# Patient Record
Sex: MALE | Race: OTHER | Employment: FULL TIME | URBAN - METROPOLITAN AREA
[De-identification: names, ages, dates, MRNs, and addresses within clinical notes are randomized per-mention and may not be internally consistent; named-entity substitution may affect disease eponyms.]

---

## 2024-06-04 ENCOUNTER — APPOINTMENT (EMERGENCY)
Dept: CT IMAGING | Facility: HOSPITAL | Age: 24
End: 2024-06-04
Payer: MEDICAID

## 2024-06-04 ENCOUNTER — HOSPITAL ENCOUNTER (EMERGENCY)
Facility: HOSPITAL | Age: 24
Discharge: HOME | End: 2024-06-04
Attending: INTERNAL MEDICINE
Payer: MEDICAID

## 2024-06-04 VITALS
DIASTOLIC BLOOD PRESSURE: 80 MMHG | HEART RATE: 96 BPM | TEMPERATURE: 97.8 F | SYSTOLIC BLOOD PRESSURE: 141 MMHG | RESPIRATION RATE: 16 BRPM | OXYGEN SATURATION: 100 % | WEIGHT: 266.98 LBS

## 2024-06-04 DIAGNOSIS — K59.00 CONSTIPATION: Primary | ICD-10-CM

## 2024-06-04 LAB
ALBUMIN SERPL BCP-MCNC: 4.7 G/DL (ref 3.5–5)
ALP SERPL-CCNC: 66 U/L (ref 34–104)
ALT SERPL W P-5'-P-CCNC: 16 U/L (ref 7–52)
ANION GAP SERPL CALCULATED.3IONS-SCNC: 13 MMOL/L (ref 4–13)
AST SERPL W P-5'-P-CCNC: 19 U/L (ref 13–39)
BASOPHILS # BLD AUTO: 0.06 THOUSANDS/ÂΜL (ref 0–0.1)
BASOPHILS NFR BLD AUTO: 1 % (ref 0–1)
BILIRUB SERPL-MCNC: 0.6 MG/DL (ref 0.2–1)
BUN SERPL-MCNC: 11 MG/DL (ref 5–25)
CALCIUM SERPL-MCNC: 10 MG/DL (ref 8.4–10.2)
CHLORIDE SERPL-SCNC: 101 MMOL/L (ref 96–108)
CO2 SERPL-SCNC: 21 MMOL/L (ref 21–32)
CREAT SERPL-MCNC: 0.89 MG/DL (ref 0.6–1.3)
EOSINOPHIL # BLD AUTO: 0.03 THOUSAND/ÂΜL (ref 0–0.61)
EOSINOPHIL NFR BLD AUTO: 0 % (ref 0–6)
ERYTHROCYTE [DISTWIDTH] IN BLOOD BY AUTOMATED COUNT: 12.4 % (ref 11.6–15.1)
GFR SERPL CREATININE-BSD FRML MDRD: 119 ML/MIN/1.73SQ M
GLUCOSE SERPL-MCNC: 89 MG/DL (ref 65–140)
HCT VFR BLD AUTO: 43.3 % (ref 36.5–49.3)
HGB BLD-MCNC: 14.6 G/DL (ref 12–17)
IMM GRANULOCYTES # BLD AUTO: 0.06 THOUSAND/UL (ref 0–0.2)
IMM GRANULOCYTES NFR BLD AUTO: 1 % (ref 0–2)
LIPASE SERPL-CCNC: 10 U/L (ref 11–82)
LYMPHOCYTES # BLD AUTO: 2.19 THOUSANDS/ÂΜL (ref 0.6–4.47)
LYMPHOCYTES NFR BLD AUTO: 21 % (ref 14–44)
MCH RBC QN AUTO: 28.4 PG (ref 26.8–34.3)
MCHC RBC AUTO-ENTMCNC: 33.7 G/DL (ref 31.4–37.4)
MCV RBC AUTO: 84 FL (ref 82–98)
MONOCYTES # BLD AUTO: 0.78 THOUSAND/ÂΜL (ref 0.17–1.22)
MONOCYTES NFR BLD AUTO: 7 % (ref 4–12)
NEUTROPHILS # BLD AUTO: 7.51 THOUSANDS/ÂΜL (ref 1.85–7.62)
NEUTS SEG NFR BLD AUTO: 70 % (ref 43–75)
NRBC BLD AUTO-RTO: 0 /100 WBCS
PLATELET # BLD AUTO: 272 THOUSANDS/UL (ref 149–390)
PMV BLD AUTO: 11 FL (ref 8.9–12.7)
POTASSIUM SERPL-SCNC: 3.8 MMOL/L (ref 3.5–5.3)
PROT SERPL-MCNC: 8.4 G/DL (ref 6.4–8.4)
RBC # BLD AUTO: 5.14 MILLION/UL (ref 3.88–5.62)
SODIUM SERPL-SCNC: 135 MMOL/L (ref 135–147)
WBC # BLD AUTO: 10.63 THOUSAND/UL (ref 4.31–10.16)

## 2024-06-04 PROCEDURE — 80053 COMPREHEN METABOLIC PANEL: CPT

## 2024-06-04 PROCEDURE — 85025 COMPLETE CBC W/AUTO DIFF WBC: CPT

## 2024-06-04 PROCEDURE — 83690 ASSAY OF LIPASE: CPT

## 2024-06-04 PROCEDURE — 99285 EMERGENCY DEPT VISIT HI MDM: CPT

## 2024-06-04 PROCEDURE — 74177 CT ABD & PELVIS W/CONTRAST: CPT

## 2024-06-04 PROCEDURE — 36415 COLL VENOUS BLD VENIPUNCTURE: CPT

## 2024-06-04 PROCEDURE — 99283 EMERGENCY DEPT VISIT LOW MDM: CPT

## 2024-06-04 PROCEDURE — 96360 HYDRATION IV INFUSION INIT: CPT

## 2024-06-04 RX ORDER — KETOROLAC TROMETHAMINE 30 MG/ML
15 INJECTION, SOLUTION INTRAMUSCULAR; INTRAVENOUS ONCE
Status: DISCONTINUED | OUTPATIENT
Start: 2024-06-04 | End: 2024-06-04 | Stop reason: HOSPADM

## 2024-06-04 RX ORDER — POLYETHYLENE GLYCOL 3350 17 G/17G
136 POWDER, FOR SOLUTION ORAL ONCE
Qty: 136 G | Refills: 0 | Status: SHIPPED | OUTPATIENT
Start: 2024-06-05 | End: 2024-06-05

## 2024-06-04 RX ADMIN — IOHEXOL 100 ML: 350 INJECTION, SOLUTION INTRAVENOUS at 09:41

## 2024-06-04 RX ADMIN — SODIUM CHLORIDE 1000 ML: 0.9 INJECTION, SOLUTION INTRAVENOUS at 08:58

## 2024-06-04 NOTE — ED PROVIDER NOTES
"History  Chief Complaint   Patient presents with    Constipation     States that he has been constipated for over a month. States that last bm was 3 weeks ago     24-year-old male presents today with concerns of constipation for the last month.  Patient states that he has not had a bowel movement in the last 3 weeks.  Patient states has had similar history of the same.  Denies any rectal bleeding or rectal pain.  States that he feels like he \"is about to burst\".  Denies any nausea or vomiting.  States that he has been eating but states that he has not been having any bowel movements.  No fever or chills.  No sick contacts.  No chest pain or shortness of breath.  Denies any cough.  No urinary symptoms.        None       Past Medical History:   Diagnosis Date    No known health problems        Past Surgical History:   Procedure Laterality Date    NO PAST SURGERIES         History reviewed. No pertinent family history.  I have reviewed and agree with the history as documented.    E-Cigarette/Vaping    E-Cigarette Use Never User      E-Cigarette/Vaping Substances     Social History     Tobacco Use    Smoking status: Never    Smokeless tobacco: Never   Vaping Use    Vaping status: Never Used   Substance Use Topics    Alcohol use: Never    Drug use: Never       Review of Systems   Constitutional:  Negative for chills and fever.   HENT:  Negative for ear pain and sore throat.    Eyes:  Negative for pain and visual disturbance.   Respiratory:  Negative for cough and shortness of breath.    Cardiovascular:  Negative for chest pain and palpitations.   Gastrointestinal:  Positive for abdominal distention, abdominal pain and constipation. Negative for anal bleeding, blood in stool, diarrhea, nausea, rectal pain and vomiting.   Genitourinary:  Negative for dysuria and hematuria.   Musculoskeletal:  Negative for arthralgias and back pain.   Skin:  Negative for color change and rash.   Neurological:  Negative for seizures and " syncope.   All other systems reviewed and are negative.      Physical Exam  Physical Exam  Vitals and nursing note reviewed.   Constitutional:       General: He is not in acute distress.     Appearance: He is ill-appearing.   HENT:      Head: Normocephalic and atraumatic.   Eyes:      General: No scleral icterus.     Conjunctiva/sclera: Conjunctivae normal.      Pupils: Pupils are equal, round, and reactive to light.   Cardiovascular:      Rate and Rhythm: Normal rate and regular rhythm.      Pulses: Normal pulses.   Pulmonary:      Effort: Pulmonary effort is normal. No respiratory distress.   Abdominal:      General: There is distension.      Palpations: There is no mass.      Tenderness: There is abdominal tenderness. There is no right CVA tenderness, left CVA tenderness, guarding or rebound.      Hernia: No hernia is present.   Musculoskeletal:         General: Normal range of motion.      Cervical back: Normal range of motion.   Skin:     General: Skin is warm and dry.      Capillary Refill: Capillary refill takes less than 2 seconds.      Coloration: Skin is not jaundiced.   Neurological:      Mental Status: He is alert and oriented to person, place, and time.         Vital Signs  ED Triage Vitals   Temperature Pulse Respirations Blood Pressure SpO2   06/04/24 0828 06/04/24 0828 06/04/24 0828 06/04/24 0828 06/04/24 0828   97.8 °F (36.6 °C) (!) 110 20 148/77 94 %      Temp Source Heart Rate Source Patient Position - Orthostatic VS BP Location FiO2 (%)   06/04/24 0828 06/04/24 0828 06/04/24 1019 06/04/24 1019 --   Tympanic Monitor Lying Left arm       Pain Score       --                  Vitals:    06/04/24 0828 06/04/24 1019   BP: 148/77 141/80   Pulse: (!) 110 96   Patient Position - Orthostatic VS:  Lying         Visual Acuity      ED Medications  Medications   sodium chloride 0.9 % bolus 1,000 mL (0 mL Intravenous Stopped 6/4/24 1015)   iohexol (OMNIPAQUE) 350 MG/ML injection (MULTI-DOSE) 100 mL (100 mL  Intravenous Given 6/4/24 0941)       Diagnostic Studies  Results Reviewed       Procedure Component Value Units Date/Time    Comprehensive metabolic panel [[id number]] Collected: 06/04/24 0858    Lab Status: Final result Specimen: Blood from Arm, Right Updated: 06/04/24 0922     Sodium 135 mmol/L      Potassium 3.8 mmol/L      Chloride 101 mmol/L      CO2 21 mmol/L      ANION GAP 13 mmol/L      BUN 11 mg/dL      Creatinine 0.89 mg/dL      Glucose 89 mg/dL      Calcium 10.0 mg/dL      AST 19 U/L      ALT 16 U/L      Alkaline Phosphatase 66 U/L      Total Protein 8.4 g/dL      Albumin 4.7 g/dL      Total Bilirubin 0.60 mg/dL      eGFR 119 ml/min/1.73sq m     Narrative:      National Kid[employer] Foundation guidelines for Chronic Kidney Disease (CKD):     Stage 1 with normal or high GFR (GFR > 90 mL/min/1.73 square meters)    Stage 2 Mild CKD (GFR = 60-89 mL/min/1.73 square meters)    Stage 3A Moderate CKD (GFR = 45-59 mL/min/1.73 square meters)    Stage 3B Moderate CKD (GFR = 30-44 mL/min/1.73 square meters)    Stage 4 Severe CKD (GFR = 15-29 mL/min/1.73 square meters)    Stage 5 End Stage CKD (GFR <15 mL/min/1.73 square meters)  Note: GFR calculation is accurate only with a steady state creatinine    Lipase [[id number]]  (Abnormal) Collected: 06/04/24 0858    Lab Status: Final result Specimen: Blood from Arm, Right Updated: 06/04/24 0922     Lipase 10 u/L     CBC and differential [[id number]]  (Abnormal) Collected: 06/04/24 0858    Lab Status: Final result Specimen: Blood from Arm, Right Updated: 06/04/24 0903     WBC 10.63 Thousand/uL      RBC 5.14 Million/uL      Hemoglobin 14.6 g/dL      Hematocrit 43.3 %      MCV 84 fL      MCH 28.4 pg      MCHC 33.7 g/dL      RDW 12.4 %      MPV 11.0 fL      Platelets 272 Thousands/uL      nRBC 0 /100 WBCs      Segmented % 70 %      Immature Grans % 1 %      Lymphocytes % 21 %      Monocytes % 7 %      Eosinophils Relative 0 %      Basophils Relative 1 %      Absolute Neutrophils  "7.51 Thousands/µL      Absolute Immature Grans 0.06 Thousand/uL      Absolute Lymphocytes 2.19 Thousands/µL      Absolute Monocytes 0.78 Thousand/µL      Eosinophils Absolute 0.03 Thousand/µL      Basophils Absolute 0.06 Thousands/µL                    CT abdomen pelvis with contrast   Final Result by Tony Diaz MD (06/04 0959)      No acute abdominopelvic findings.         Workstation performed: [id number]                    Procedures  Procedures         ED Course  ED Course as of 06/04/24 1404   Tue Jun 04, 2024   0906 WBC(!): 10.63                               SBIRT 20yo+      Flowsheet Row Most Recent Value   Initial Alcohol Screen: US AUDIT-C     1. How often do you have a drink containing alcohol? 0 Filed at: 06/04/2024 0844   2. How many drinks containing alcohol do you have on a typical day you are drinking?  0 Filed at: 06/04/2024 0844   3a. Male UNDER 65: How often do you have five or more drinks on one occasion? 0 Filed at: 06/04/2024 0844   Audit-C Score 0 Filed at: 06/04/2024 0844   CORNELL: How many times in the past year have you...    Used an illegal drug or used a prescription medication for non-medical reasons? Never Filed at: 06/04/2024 0844                      Medical Decision Making  24-year-old male presents today with concerns of constipation and abdominal pain.  Lab workup.  Toradol.  Will stay away from any opioids as this may worsen patient's symptoms.  CT abdomen pelvis secondary to tenderness palpation or guarding, diffuse in the abdomen.  Patient states he is \"tried everything\" for this constipation.  Lab workup reassuring.  CT abdomen pelvis did not show any acute intra-abdominal pathology.  No obstruction.  No impaction.  Discharged with MiraLAX and enema.  GI failure.  Referral sent.  ------------------------------------------------------------  Strict return precautions discussed. Patient at time of discharge well-appearing in no acute distress, all questions answered. " "Patient agreeable to plan.  Patient's vitals, lab/imaging results, diagnosis, and treatment plan were discussed with the patient. All new/changed medications were discussed with patient, specifically, route of administration, how often and when to take, and where they can be picked up. Strict return precautions as well as close follow up with PCP was discussed with the patient and the patient was agreeable to my recommendations.  Patient verbally acknowledged understanding of the above communications. All labs reviewed and utilized in the medical decision making process (if labs were ordered). Portions of the record may have been created with voice recognition software.  Occasional wrong word or \"sound a like\" substitutions may have occurred due to the inherent limitations of voice recognition software.  Read the chart carefully and recognize, using context, where substitutions have occurred.      Amount and/or Complexity of Data Reviewed  Labs: ordered. Decision-making details documented in ED Course.  Radiology: ordered.    Risk  OTC drugs.  Prescription drug management.             Disposition  Final diagnoses:   Constipation     Time reflects when diagnosis was documented in both MDM as applicable and the Disposition within this note       Time User Action Codes Description Comment    6/4/2024 10:09 AM Hugo Wolf Add [K59.00] Constipation           ED Disposition       ED Disposition   Discharge    Condition   Stable    Date/Time   Tue Jun 4, 2024 1009    Comment   Joyce Mari discharge to home/self care.                   Follow-up Information       Follow up With Specialties Details Why Contact Info Additional Information    [facility] Emergency Department Emergency Medicine Go to  If symptoms worsen [address] [facility] [id number] [phone] [facility] Emergency Department    [facility] [place] " Gastroenterology Schedule an appointment as soon as possible for a visit   [address] [place] [id number] [phone] [facility] [place], [address] [facility].  [place], [place] [address] [phone]            Discharge Medication List as of 6/4/2024 10:12 AM        START taking these medications    Details   bisacodyl (FLEET) 10 MG/30ML ENEM Insert 30 mL (10 mg total) into the rectum once for 1 dose Do not start before June 5, 2024., Starting Wed 6/5/2024, Normal      polyethylene glycol (MIRALAX) 17 g packet Take 136 g by mouth once for 1 dose With two large glasses of water. Do not start before June 5, 2024., Starting Wed 6/5/2024, Normal                 PDMP Review       None            ED Provider  Electronically Signed by             Hugo Wolf PA-C  06/04/24 3145

## 2024-06-04 NOTE — Clinical Note
Jyoce Mari was seen and treated in our emergency department on 6/4/2024.    No restrictions            Diagnosis:     Joyce  .    He may return on this date: 06/06/2024    Patient needs to be off 6/5 for treatment.     If you have any questions or concerns, please don't hesitate to call.      Day Berger MD    ______________________________           _______________          _______________  Hospital Representative                              Date                                Time

## 2024-07-18 ENCOUNTER — HOSPITAL ENCOUNTER (EMERGENCY)
Facility: HOSPITAL | Age: 24
Discharge: HOME | End: 2024-07-18
Attending: EMERGENCY MEDICINE
Payer: MEDICAID

## 2024-07-18 ENCOUNTER — APPOINTMENT (EMERGENCY)
Dept: CT IMAGING | Facility: HOSPITAL | Age: 24
End: 2024-07-18
Payer: MEDICAID

## 2024-07-18 VITALS
TEMPERATURE: 97.6 F | DIASTOLIC BLOOD PRESSURE: 60 MMHG | SYSTOLIC BLOOD PRESSURE: 117 MMHG | RESPIRATION RATE: 18 BRPM | WEIGHT: 266.76 LBS | HEART RATE: 83 BPM | OXYGEN SATURATION: 98 %

## 2024-07-18 DIAGNOSIS — R10.9 ABDOMINAL PAIN: Primary | ICD-10-CM

## 2024-07-18 DIAGNOSIS — K29.70 GASTRITIS: ICD-10-CM

## 2024-07-18 LAB
ANION GAP SERPL CALCULATED.3IONS-SCNC: 7 MMOL/L (ref 4–13)
BACTERIA UR QL AUTO: ABNORMAL /HPF
BASOPHILS # BLD AUTO: 0.06 THOUSANDS/ÂΜL (ref 0–0.1)
BASOPHILS NFR BLD AUTO: 1 % (ref 0–1)
BILIRUB UR QL STRIP: NEGATIVE
BUN SERPL-MCNC: 10 MG/DL (ref 5–25)
CALCIUM SERPL-MCNC: 9.1 MG/DL (ref 8.4–10.2)
CHLORIDE SERPL-SCNC: 99 MMOL/L (ref 96–108)
CLARITY UR: CLEAR
CO2 SERPL-SCNC: 26 MMOL/L (ref 21–32)
COLOR UR: YELLOW
CREAT SERPL-MCNC: 0.8 MG/DL (ref 0.6–1.3)
EOSINOPHIL # BLD AUTO: 0.02 THOUSAND/ÂΜL (ref 0–0.61)
EOSINOPHIL NFR BLD AUTO: 0 % (ref 0–6)
ERYTHROCYTE [DISTWIDTH] IN BLOOD BY AUTOMATED COUNT: 12.6 % (ref 11.6–15.1)
GFR SERPL CREATININE-BSD FRML MDRD: 125 ML/MIN/1.73SQ M
GLUCOSE SERPL-MCNC: 85 MG/DL (ref 65–140)
GLUCOSE UR STRIP-MCNC: NEGATIVE MG/DL
HCT VFR BLD AUTO: 40.9 % (ref 36.5–49.3)
HGB BLD-MCNC: 13.8 G/DL (ref 12–17)
HGB UR QL STRIP.AUTO: ABNORMAL
IMM GRANULOCYTES # BLD AUTO: 0.03 THOUSAND/UL (ref 0–0.2)
IMM GRANULOCYTES NFR BLD AUTO: 0 % (ref 0–2)
KETONES UR STRIP-MCNC: ABNORMAL MG/DL
LACTATE SERPL-SCNC: 0.8 MMOL/L (ref 0.5–2)
LEUKOCYTE ESTERASE UR QL STRIP: NEGATIVE
LIPASE SERPL-CCNC: 9 U/L (ref 11–82)
LYMPHOCYTES # BLD AUTO: 1.95 THOUSANDS/ÂΜL (ref 0.6–4.47)
LYMPHOCYTES NFR BLD AUTO: 20 % (ref 14–44)
MCH RBC QN AUTO: 27.8 PG (ref 26.8–34.3)
MCHC RBC AUTO-ENTMCNC: 33.7 G/DL (ref 31.4–37.4)
MCV RBC AUTO: 82 FL (ref 82–98)
MONOCYTES # BLD AUTO: 0.84 THOUSAND/ÂΜL (ref 0.17–1.22)
MONOCYTES NFR BLD AUTO: 9 % (ref 4–12)
NEUTROPHILS # BLD AUTO: 6.84 THOUSANDS/ÂΜL (ref 1.85–7.62)
NEUTS SEG NFR BLD AUTO: 70 % (ref 43–75)
NITRITE UR QL STRIP: NEGATIVE
NON-SQ EPI CELLS URNS QL MICRO: ABNORMAL /HPF
NRBC BLD AUTO-RTO: 0 /100 WBCS
PH UR STRIP.AUTO: 6 [PH] (ref 4.5–8)
PLATELET # BLD AUTO: 233 THOUSANDS/UL (ref 149–390)
PMV BLD AUTO: 10.9 FL (ref 8.9–12.7)
POTASSIUM SERPL-SCNC: 3.7 MMOL/L (ref 3.5–5.3)
PROT UR STRIP-MCNC: NEGATIVE MG/DL
RBC # BLD AUTO: 4.97 MILLION/UL (ref 3.88–5.62)
RBC #/AREA URNS AUTO: ABNORMAL /HPF
SODIUM SERPL-SCNC: 132 MMOL/L (ref 135–147)
SP GR UR STRIP.AUTO: <=1.005 (ref 1–1.03)
TSH SERPL DL<=0.05 MIU/L-ACNC: 3.21 UIU/ML (ref 0.45–4.5)
UROBILINOGEN UR QL STRIP.AUTO: 0.2 E.U./DL
WBC # BLD AUTO: 9.74 THOUSAND/UL (ref 4.31–10.16)
WBC #/AREA URNS AUTO: ABNORMAL /HPF

## 2024-07-18 PROCEDURE — 99284 EMERGENCY DEPT VISIT MOD MDM: CPT

## 2024-07-18 PROCEDURE — 83605 ASSAY OF LACTIC ACID: CPT

## 2024-07-18 PROCEDURE — 96374 THER/PROPH/DIAG INJ IV PUSH: CPT

## 2024-07-18 PROCEDURE — 36415 COLL VENOUS BLD VENIPUNCTURE: CPT

## 2024-07-18 PROCEDURE — 83690 ASSAY OF LIPASE: CPT

## 2024-07-18 PROCEDURE — 85025 COMPLETE CBC W/AUTO DIFF WBC: CPT

## 2024-07-18 PROCEDURE — 80048 BASIC METABOLIC PNL TOTAL CA: CPT

## 2024-07-18 PROCEDURE — 74177 CT ABD & PELVIS W/CONTRAST: CPT

## 2024-07-18 PROCEDURE — 84443 ASSAY THYROID STIM HORMONE: CPT

## 2024-07-18 PROCEDURE — 96361 HYDRATE IV INFUSION ADD-ON: CPT

## 2024-07-18 PROCEDURE — 81001 URINALYSIS AUTO W/SCOPE: CPT

## 2024-07-18 PROCEDURE — 99285 EMERGENCY DEPT VISIT HI MDM: CPT

## 2024-07-18 RX ORDER — SUCRALFATE 1 G/1
1 TABLET ORAL DAILY
Qty: 7 TABLET | Refills: 0 | Status: SHIPPED | OUTPATIENT
Start: 2024-07-18 | End: 2024-07-25

## 2024-07-18 RX ORDER — FAMOTIDINE 20 MG/1
20 TABLET, FILM COATED ORAL 2 TIMES DAILY
Qty: 15 TABLET | Refills: 0 | Status: SHIPPED | OUTPATIENT
Start: 2024-07-18

## 2024-07-18 RX ORDER — SUCRALFATE 1 G/1
1 TABLET ORAL ONCE
Status: COMPLETED | OUTPATIENT
Start: 2024-07-18 | End: 2024-07-18

## 2024-07-18 RX ORDER — KETOROLAC TROMETHAMINE 30 MG/ML
15 INJECTION, SOLUTION INTRAMUSCULAR; INTRAVENOUS ONCE
Status: COMPLETED | OUTPATIENT
Start: 2024-07-18 | End: 2024-07-18

## 2024-07-18 RX ADMIN — SUCRALFATE 1 G: 1 TABLET ORAL at 11:24

## 2024-07-18 RX ADMIN — IOHEXOL 100 ML: 350 INJECTION, SOLUTION INTRAVENOUS at 10:20

## 2024-07-18 RX ADMIN — KETOROLAC TROMETHAMINE 15 MG: 30 INJECTION, SOLUTION INTRAMUSCULAR; INTRAVENOUS at 09:35

## 2024-07-18 RX ADMIN — SODIUM CHLORIDE 1000 ML: 0.9 INJECTION, SOLUTION INTRAVENOUS at 09:38

## 2024-07-18 NOTE — DISCHARGE INSTRUCTIONS
Patient advised to follow-up PCP for today's ED visit.  Patient vies follow-up with gastroenterology for today's ED visit.  Patient advised to take prescribed medication as prescribed. Patient was advised to return to the ED with any worsening symptoms that were explained on discharge including but not limited to chest pain, shortness of breath, irretractable vomiting or diarrhea, vision loss, loss of function, loss of sensation, syncope, hemoptysis, hematochezia, hematemesis, melena, decreased oral intake, feeling ill.     Ambulatory referral placed to GI.  Symptoms worsen before visit please return to ED

## 2024-07-18 NOTE — Clinical Note
Joyce Mari was seen and treated in our emergency department on 7/18/2024.                Diagnosis: gerd/abdominal pain    Joyce  may return to work on return date.    He may return on this date: 07/19/2024         If you have any questions or concerns, please don't hesitate to call.      Clayton Man PA-C    ______________________________           _______________          _______________  Hospital Representative                              Date                                Time

## 2024-07-18 NOTE — ED PROVIDER NOTES
"History  Chief Complaint   Patient presents with    Abdominal Pain     Ongoing upper abdominal burning. \"I've been dealing with this all my life and the symptoms line up.\" Concerned for obstruction, but has no formal GI dx. Symptoms getting worse for last 2 weeks.      24-year-old male presented to ED with a chief complaint of left upper quadrant abdominal pain.  Stated that he has been dealing with this pain for the past 2 weeks worsening in the last week.  Patient states that it is a burning sensation throughout his abdomen and he is extremely concerned of a bowel obstruction and stated that his last regular bowel movement was about a month ago and any bowel movement he has now feels like it is just bile and liquid.  Patient also endorses nausea and vomiting over the last week.  He states is gone to a point where he cannot deal with anymore he rates the pain an 8 out of 10.  He states that he has not been eating due to the pain.  Patient denies any chills fevers diaphoresis.  Patient stated that he has been dealing with no problems of swelling to her legs and has been seen for obstruction symptoms in the past.  He states that this time it is 10 times worse than the previous times.  He denies any hematochezia. Patient denies any chest pain, shortness of breath, vomiting, diarrhea, chills, diaphoresis, fevers, loss of consciousness, syncope, urinary and bowel changes, visual symptoms, vision loss, loss of function, loss of sensation,  hemoptysis, hematochezia, hematemesis, melena, confusion.         Prior to Admission Medications   Prescriptions Last Dose Informant Patient Reported? Taking?   bisacodyl (FLEET) 10 MG/30ML ENEM   No No   Sig: Insert 30 mL (10 mg total) into the rectum once for 1 dose Do not start before June 5, 2024.   polyethylene glycol (MIRALAX) 17 g packet   No No   Sig: Take 136 g by mouth once for 1 dose With two large glasses of water. Do not start before June 5, 2024.    "   Facility-Administered Medications: None       Past Medical History:   Diagnosis Date    No known health problems        Past Surgical History:   Procedure Laterality Date    NO PAST SURGERIES         History reviewed. No pertinent family history.  I have reviewed and agree with the history as documented.    E-Cigarette/Vaping    E-Cigarette Use Never User      E-Cigarette/Vaping Substances     Social History     Tobacco Use    Smoking status: Never    Smokeless tobacco: Never   Vaping Use    Vaping status: Never Used   Substance Use Topics    Alcohol use: Never    Drug use: Never       Review of Systems   Constitutional:  Positive for appetite change. Negative for activity change, chills, diaphoresis, fatigue and fever.   HENT:  Negative for congestion, ear discharge, ear pain, postnasal drip, rhinorrhea, sinus pressure, sinus pain and sore throat.    Eyes:  Negative for photophobia, pain, discharge, redness, itching and visual disturbance.   Respiratory:  Negative for cough, chest tightness and shortness of breath.    Cardiovascular:  Negative for chest pain and palpitations.   Gastrointestinal:  Positive for abdominal distention, abdominal pain, constipation, diarrhea, nausea and vomiting.   Genitourinary:  Negative for difficulty urinating, dysuria, flank pain, frequency and hematuria.   Musculoskeletal:  Negative for arthralgias, back pain, joint swelling, myalgias, neck pain and neck stiffness.   Skin:  Negative for rash and wound.   Neurological:  Negative for dizziness, tremors, syncope, facial asymmetry, weakness, light-headedness, numbness and headaches.       Physical Exam  Physical Exam  Vitals and nursing note reviewed.   Constitutional:       General: He is not in acute distress.     Appearance: He is normal weight. He is ill-appearing. He is not toxic-appearing or diaphoretic.   HENT:      Head: Normocephalic.      Right Ear: Tympanic membrane, ear canal and external ear normal.      Left Ear:  Tympanic membrane, ear canal and external ear normal.      Nose: Nose normal. No congestion or rhinorrhea.      Mouth/Throat:      Mouth: Mucous membranes are moist.      Pharynx: Oropharynx is clear. No oropharyngeal exudate or posterior oropharyngeal erythema.   Eyes:      General:         Right eye: No discharge.         Left eye: No discharge.      Extraocular Movements: Extraocular movements intact.      Conjunctiva/sclera: Conjunctivae normal.      Pupils: Pupils are equal, round, and reactive to light.   Cardiovascular:      Rate and Rhythm: Normal rate and regular rhythm.      Pulses: Normal pulses.   Pulmonary:      Effort: Pulmonary effort is normal. No respiratory distress.      Breath sounds: Normal breath sounds. No stridor. No wheezing, rhonchi or rales.   Chest:      Chest wall: No tenderness.   Abdominal:      General: Bowel sounds are normal. There is distension.      Palpations: Abdomen is soft.      Tenderness: There is abdominal tenderness. There is no right CVA tenderness, left CVA tenderness, guarding or rebound.      Comments: Tenderness to palpation throughout the abdomen especially in the left upper quadrant.  Marques sign is negative no rebound tenderness no McBurney point tenderness.  No erythema edema or ecchymosis noted.  No rashes noted.  Patient does have an elevation of the left rib cage unsure if this is a follow-up for possible splenomegaly.  Overall abdomen is soft bowel sounds are heard.  Will CT to further evaluate   Musculoskeletal:         General: No tenderness. Normal range of motion.      Cervical back: Normal range of motion and neck supple. No rigidity or tenderness.   Lymphadenopathy:      Cervical: No cervical adenopathy.   Skin:     General: Skin is warm and dry.      Capillary Refill: Capillary refill takes less than 2 seconds.      Findings: No rash.   Neurological:      Mental Status: He is alert and oriented to person, place, and time.      Sensory: No sensory  deficit.   Psychiatric:         Mood and Affect: Mood normal.         Vital Signs  ED Triage Vitals   Temperature Pulse Respirations Blood Pressure SpO2   07/18/24 0908 07/18/24 0908 07/18/24 0908 07/18/24 0908 07/18/24 0908   97.6 °F (36.4 °C) 98 16 132/63 97 %      Temp Source Heart Rate Source Patient Position - Orthostatic VS BP Location FiO2 (%)   07/18/24 0908 07/18/24 0908 07/18/24 0908 07/18/24 0908 --   Oral Monitor Sitting Right arm       Pain Score       07/18/24 0909       8           Vitals:    07/18/24 0908 07/18/24 1045   BP: 132/63 117/60   Pulse: 98 83   Patient Position - Orthostatic VS: Sitting          Visual Acuity      ED Medications  Medications   sodium chloride 0.9 % bolus 1,000 mL (0 mL Intravenous Stopped 7/18/24 1218)   ketorolac (TORADOL) injection 15 mg (15 mg Intravenous Given 7/18/24 0935)   iohexol (OMNIPAQUE) 350 MG/ML injection (SINGLE-DOSE) 100 mL (100 mL Intravenous Given 7/18/24 1020)   sucralfate (CARAFATE) tablet 1 g (1 g Oral Given 7/18/24 1124)       Diagnostic Studies  Results Reviewed       Procedure Component Value Units Date/Time    Urine Microscopic [[id number]]  (Abnormal) Collected: 07/18/24 1036    Lab Status: Final result Specimen: Urine, Clean Catch Updated: 07/18/24 1153     RBC, UA 1-2 /hpf      WBC, UA 2-4 /hpf      Epithelial Cells Occasional /hpf      Bacteria, UA None Seen /hpf     Urine Macroscopic, POC [[id number]]  (Abnormal) Collected: 07/18/24 1036    Lab Status: Final result Specimen: Urine Updated: 07/18/24 1037     Color, UA Yellow     Clarity, UA Clear     pH, UA 6.0     Leukocytes, UA Negative     Nitrite, UA Negative     Protein, UA Negative mg/dl      Glucose, UA Negative mg/dl      Ketones, UA 15 (1+) mg/dl      Urobilinogen, UA 0.2 E.U./dl      Bilirubin, UA Negative     Occult Blood, UA Trace     Specific Gravity, UA <=1.005    Narrative:      CLINITEK RESULT    TSH, 3rd generation with Free T4 reflex [[id number]]  (Normal) Collected: 07/18/24  0938    Lab Status: Final result Specimen: Blood from Arm, Left Updated: 07/18/24 1020     TSH 3RD GENERATON 3.205 uIU/mL     Lipase [[id number]]  (Abnormal) Collected: 07/18/24 0938    Lab Status: Final result Specimen: Blood from Arm, Left Updated: 07/18/24 1004     Lipase 9 u/L     Basic metabolic panel [[id number]]  (Abnormal) Collected: 07/18/24 0938    Lab Status: Final result Specimen: Blood from Arm, Left Updated: 07/18/24 1004     Sodium 132 mmol/L      Potassium 3.7 mmol/L      Chloride 99 mmol/L      CO2 26 mmol/L      ANION GAP 7 mmol/L      BUN 10 mg/dL      Creatinine 0.80 mg/dL      Glucose 85 mg/dL      Calcium 9.1 mg/dL      eGFR 125 ml/min/1.73sq m     Narrative:      National Kid[employer] guidelines for Chronic Kidney Disease (CKD):     Stage 1 with normal or high GFR (GFR > 90 mL/min/1.73 square meters)    Stage 2 Mild CKD (GFR = 60-89 mL/min/1.73 square meters)    Stage 3A Moderate CKD (GFR = 45-59 mL/min/1.73 square meters)    Stage 3B Moderate CKD (GFR = 30-44 mL/min/1.73 square meters)    Stage 4 Severe CKD (GFR = 15-29 mL/min/1.73 square meters)    Stage 5 End Stage CKD (GFR <15 mL/min/1.73 square meters)  Note: GFR calculation is accurate only with a steady state creatinine    Lactic acid, plasma (w/reflex if result > 2.0) [[id number]]  (Normal) Collected: 07/18/24 0938    Lab Status: Final result Specimen: Blood from Arm, Left Updated: 07/18/24 1003     LACTIC ACID 0.8 mmol/L     Narrative:      Result may be elevated if tourniquet was used during collection.    CBC and differential [[id number]] Collected: 07/18/24 0938    Lab Status: Final result Specimen: Blood from Arm, Left Updated: 07/18/24 0945     WBC 9.74 Thousand/uL      RBC 4.97 Million/uL      Hemoglobin 13.8 g/dL      Hematocrit 40.9 %      MCV 82 fL      MCH 27.8 pg      MCHC 33.7 g/dL      RDW 12.6 %      MPV 10.9 fL      Platelets 233 Thousands/uL      nRBC 0 /100 WBCs      Segmented % 70 %      Immature Grans % 0 %       Lymphocytes % 20 %      Monocytes % 9 %      Eosinophils Relative 0 %      Basophils Relative 1 %      Absolute Neutrophils 6.84 Thousands/µL      Absolute Immature Grans 0.03 Thousand/uL      Absolute Lymphocytes 1.95 Thousands/µL      Absolute Monocytes 0.84 Thousand/µL      Eosinophils Absolute 0.02 Thousand/µL      Basophils Absolute 0.06 Thousands/µL                    CT abdomen pelvis with contrast   Final Result by Loki Ramirez MD (07/18 1047)   No acute findings.            Workstation performed: [id number]                    Procedures  Procedures         ED Course  ED Course as of 07/18/24 1806   Thu Jul 18, 2024   1047 Sodium(!): 132  Given bolus                                   SBIRT 22yo+      Flowsheet Row Most Recent Value   Initial Alcohol Screen: US AUDIT-C     1. How often do you have a drink containing alcohol? 0 Filed at: 07/18/2024 1103   2. How many drinks containing alcohol do you have on a typical day you are drinking?  0 Filed at: 07/18/2024 1103   3a. Male UNDER 65: How often do you have five or more drinks on one occasion? 0 Filed at: 07/18/2024 1103   Audit-C Score 0 Filed at: 07/18/2024 1103   CORNELL: How many times in the past year have you...    Used an illegal drug or used a prescription medication for non-medical reasons? Never Filed at: 07/18/2024 1103                      Medical Decision Making  24-year-old male presented to ED with a chief complaint of concerns for bowel obstruction.  Has burning sensation along the upper abdomen especially in the left upper quadrant.  Cardiopulmonary exam is benign on examination of the abdomen patient having tenderness over the epigastrium and left upper quadrant.  No lower abdominal tenderness.  Patient stated that his last normal bowel movement was a week ago.  He stated since he has just been having diarrhea.  Patient denying any vomiting.  States that on occasion he does have some nausea.  No McBurney point tenderness Marques sign is  "negative no rebound tenderness on the abdomen.  Abdomen is soft and bowel sounds are normal.  Otherwise no lower extremity edema or calf tenderness.  Patient's vitals unremarkable in ED today.  Baseline labs did show hyponatremia at 132.  Patient given a bolus of fluid in ED.  CT scan shows no acute abnormalities of the abdomen pelvis.  Patient did have a incidental finding of umbilical hernia fat-containing.  Also a small renal lesion too small to classify.  I explained these to the patient in which she understood had no further questions.  Carafate given in ED which did relieve patient's symptoms.  Carafate and famotidine sent to the pharmacy for symptom relief.  Patient given amatory referral to GI.Patient understood diagnosis and treatment plan and had no further questions.  Patient was discharged in stable condition.  Patient was advised to follow-up with her PCP in 1 to 2 days.  Patient was advised to return to the ED with any worsening symptoms that were explained on discharge including but not limited to chest pain, shortness of breath, irretractable vomiting or diarrhea, vision loss, loss of function, loss of sensation, syncope, hemoptysis, hematochezia, hematemesis, melena, decreased oral intake, feeling ill.     Ddx-GERD, gastritis, esophagitis, bowel obstruction, appendicitis, gastroenteritis    Portions of the record may have been created with voice recognition software. Occasional wrong word or \"sound a like\" substitutions may have occurred due to the inherent limitations of voice recognition software. Read the chart carefully and recognize, using context, where substitutions have occurred.      Amount and/or Complexity of Data Reviewed  Labs: ordered. Decision-making details documented in ED Course.     Details: See [facility]  Radiology: ordered.     Details: See [facility]    Risk  OTC drugs.  Prescription drug management.  Risk Details: Risk of worsening symptoms along with the signs and symptoms of worsening " symptoms were thoroughly discussed on discharge.  Risk of incomplete follow-up were discussed.  Patient understood all risk had no further question was discharged stable condition.                 Disposition  Final diagnoses:   Abdominal pain   Gastritis     Time reflects when diagnosis was documented in both MDM as applicable and the Disposition within this note       Time User Action Codes Description Comment    7/18/2024 12:05 PM Clayton Man [R10.9] Abdominal pain     7/18/2024 12:05 PM Clayton Man [K29.70] Gastritis           ED Disposition       ED Disposition   Discharge    Condition   Stable    Date/Time   u Jul 18, 2024 1205    Comment   Joyce Mari discharge to home/self care.                   Follow-up Information       Follow up With Specialties Details Why Contact Info Additional Information    [facility] Emergency Department Emergency Medicine   [address] [facility] [address]  [phone] [facility] Emergency Department, [address], [place], [address]    [address] [id number] [phone] [facility], [address] [place] [address], [id number] [phone]            Discharge Medication List as of 7/18/2024 12:10 PM        START taking these medications    Details   famotidine (PEPCID) 20 mg tablet Take 1 tablet (20 mg total) by mouth 2 (two) times a day, Starting Thu 7/18/2024, Normal      sucralfate (CARAFATE) 1 g tablet Take 1 tablet (1 g total) by mouth daily for 7 days, Starting Thu 7/18/2024, Until Thu 7/25/2024, Normal           CONTINUE these medications which have NOT CHANGED    Details   bisacodyl (FLEET) 10 MG/30ML ENEM Insert 30 mL (10 mg total) into the rectum once for 1 dose Do not start before June 5, 2024., Starting Wed 6/5/2024,  Normal      polyethylene glycol (MIRALAX) 17 g packet Take 136 g by mouth once for 1 dose With two large glasses of water. Do not start before June 5, 2024., Starting Wed 6/5/2024, Normal                 PDMP Review       None            ED Provider  Electronically Signed by             Clayton Man PA-C  07/18/24 5704